# Patient Record
Sex: MALE | Race: BLACK OR AFRICAN AMERICAN | NOT HISPANIC OR LATINO | URBAN - METROPOLITAN AREA
[De-identification: names, ages, dates, MRNs, and addresses within clinical notes are randomized per-mention and may not be internally consistent; named-entity substitution may affect disease eponyms.]

---

## 2023-02-23 ENCOUNTER — HOSPITAL ENCOUNTER (EMERGENCY)
Facility: OTHER | Age: 33
Discharge: HOME | End: 2023-02-23
Attending: EMERGENCY MEDICINE
Payer: COMMERCIAL

## 2023-02-23 VITALS
DIASTOLIC BLOOD PRESSURE: 96 MMHG | OXYGEN SATURATION: 96 % | WEIGHT: 242 LBS | RESPIRATION RATE: 20 BRPM | SYSTOLIC BLOOD PRESSURE: 135 MMHG | HEIGHT: 67 IN | HEART RATE: 81 BPM | TEMPERATURE: 98 F | BODY MASS INDEX: 37.98 KG/M2

## 2023-02-23 DIAGNOSIS — R19.5 DARK STOOLS: Primary | ICD-10-CM

## 2023-02-23 DIAGNOSIS — R10.13 EPIGASTRIC PAIN: ICD-10-CM

## 2023-02-23 LAB
ALBUMIN SERPL BCP-MCNC: 4.3 G/DL (ref 3.5–5.2)
ALP SERPL-CCNC: 63 U/L (ref 55–135)
ALT SERPL W/O P-5'-P-CCNC: 45 U/L (ref 10–44)
ANION GAP SERPL CALC-SCNC: 9 MMOL/L (ref 8–16)
AST SERPL-CCNC: 23 U/L (ref 10–40)
BASOPHILS # BLD AUTO: 0.05 K/UL (ref 0–0.2)
BASOPHILS NFR BLD: 0.9 % (ref 0–1.9)
BILIRUB SERPL-MCNC: 0.7 MG/DL (ref 0.1–1)
BILIRUB UR QL STRIP: NEGATIVE
BUN SERPL-MCNC: 9 MG/DL (ref 6–20)
CALCIUM SERPL-MCNC: 9.6 MG/DL (ref 8.7–10.5)
CHLORIDE SERPL-SCNC: 104 MMOL/L (ref 95–110)
CLARITY UR: CLEAR
CO2 SERPL-SCNC: 26 MMOL/L (ref 23–29)
COLOR UR: YELLOW
CREAT SERPL-MCNC: 0.9 MG/DL (ref 0.5–1.4)
DIFFERENTIAL METHOD: ABNORMAL
EOSINOPHIL # BLD AUTO: 0.3 K/UL (ref 0–0.5)
EOSINOPHIL NFR BLD: 5 % (ref 0–8)
ERYTHROCYTE [DISTWIDTH] IN BLOOD BY AUTOMATED COUNT: 12.8 % (ref 11.5–14.5)
EST. GFR  (NO RACE VARIABLE): >60 ML/MIN/1.73 M^2
GLUCOSE SERPL-MCNC: 92 MG/DL (ref 70–110)
GLUCOSE UR QL STRIP: NEGATIVE
HCT VFR BLD AUTO: 46.8 % (ref 40–54)
HGB BLD-MCNC: 15.2 G/DL (ref 14–18)
HGB UR QL STRIP: NEGATIVE
IMM GRANULOCYTES # BLD AUTO: 0.01 K/UL (ref 0–0.04)
IMM GRANULOCYTES NFR BLD AUTO: 0.2 % (ref 0–0.5)
KETONES UR QL STRIP: NEGATIVE
LEUKOCYTE ESTERASE UR QL STRIP: NEGATIVE
LIPASE SERPL-CCNC: 22 U/L (ref 4–60)
LYMPHOCYTES # BLD AUTO: 2.4 K/UL (ref 1–4.8)
LYMPHOCYTES NFR BLD: 40.7 % (ref 18–48)
MCH RBC QN AUTO: 29 PG (ref 27–31)
MCHC RBC AUTO-ENTMCNC: 32.5 G/DL (ref 32–36)
MCV RBC AUTO: 89 FL (ref 82–98)
MONOCYTES # BLD AUTO: 0.7 K/UL (ref 0.3–1)
MONOCYTES NFR BLD: 11.8 % (ref 4–15)
NEUTROPHILS # BLD AUTO: 2.4 K/UL (ref 1.8–7.7)
NEUTROPHILS NFR BLD: 41.4 % (ref 38–73)
NITRITE UR QL STRIP: NEGATIVE
NRBC BLD-RTO: 0 /100 WBC
PH UR STRIP: 6 [PH] (ref 5–8)
PLATELET # BLD AUTO: 291 K/UL (ref 150–450)
PMV BLD AUTO: 8.7 FL (ref 9.2–12.9)
POTASSIUM SERPL-SCNC: 4.3 MMOL/L (ref 3.5–5.1)
PROT SERPL-MCNC: 8.2 G/DL (ref 6–8.4)
PROT UR QL STRIP: NEGATIVE
RBC # BLD AUTO: 5.25 M/UL (ref 4.6–6.2)
SODIUM SERPL-SCNC: 139 MMOL/L (ref 136–145)
SP GR UR STRIP: 1.02 (ref 1–1.03)
URN SPEC COLLECT METH UR: NORMAL
UROBILINOGEN UR STRIP-ACNC: NEGATIVE EU/DL
WBC # BLD AUTO: 5.77 K/UL (ref 3.9–12.7)

## 2023-02-23 PROCEDURE — 99283 EMERGENCY DEPT VISIT LOW MDM: CPT

## 2023-02-23 PROCEDURE — 80053 COMPREHEN METABOLIC PANEL: CPT | Performed by: PHYSICIAN ASSISTANT

## 2023-02-23 PROCEDURE — 83690 ASSAY OF LIPASE: CPT | Performed by: PHYSICIAN ASSISTANT

## 2023-02-23 PROCEDURE — 85025 COMPLETE CBC W/AUTO DIFF WBC: CPT | Performed by: PHYSICIAN ASSISTANT

## 2023-02-23 PROCEDURE — 81003 URINALYSIS AUTO W/O SCOPE: CPT | Performed by: PHYSICIAN ASSISTANT

## 2023-02-23 RX ORDER — SUCRALFATE 1 G/10ML
1 SUSPENSION ORAL 4 TIMES DAILY
Qty: 414 ML | Refills: 0 | Status: SHIPPED | OUTPATIENT
Start: 2023-02-23

## 2023-02-24 NOTE — FIRST PROVIDER EVALUATION
Emergency Department TeleTriage Encounter Note      CHIEF COMPLAINT    Chief Complaint   Patient presents with    Melena     Pt c/o chills, HA, black stools and vomiting. One episode of black stool which was last night. Pt has had repeated episodes of vomiting which turned black last night. Denies abdominal pain at this time. Went to urgent care [facility] and was swabbed for covid and flu (negative for both) but was sent here at mention of black stool/emesis. In no acute distress, well appearing.        VITAL SIGNS   Initial Vitals [02/23/23 1811]   BP Pulse Resp Temp SpO2   (!) 135/96 81 20 98.1 °F (36.7 °C) 96 %      MAP       --            ALLERGIES    Review of patient's allergies indicates:  No Known Allergies    PROVIDER TRIAGE NOTE  Patient presents with complaint of abdominal pain (resolved), vomiting, and melena since yesterday. No prior history of GI bleed.       ORDERS  Labs Reviewed - No data to display    ED Orders (720h ago, onward)      None              Virtual Visit Note: The provider triage portion of this emergency department evaluation and documentation was performed via [employer], a HIPAA-compliant telemedicine application, in concert with a tele-presenter in the room. A face to face patient evaluation with one of my colleagues will occur once the patient is placed in an emergency department room.      DISCLAIMER: This note was prepared with ROKT voice recognition transcription software. Garbled syntax, mangled pronouns, and other bizarre constructions may be attributed to that software system.

## 2023-02-24 NOTE — ED PROVIDER NOTES
Chief complaint:  Melena (Pt c/o chills, HA, black stools and vomiting. One episode of black stool which was last night. Pt has had repeated episodes of vomiting which turned black last night. Denies abdominal pain at this time. Went to urgent care [facility] and was swabbed for covid and flu (negative for both) but was sent here at mention of black stool/emesis. In no acute distress, well appearing. )      HPI:  Mau Bello is a 32 y.o. male presenting with with concern for epigastric discomfort and dark stools since last night.  Patient was evaluated at urgent Care, and referred to the emergency department following negative flu, and COVID.  Patient denies history of known peptic ulcer disease, pancreatitis.      ROS: As per HPI and below:    Constitutional: -fever, +chills.  Eyes: No discharge. No pain.  HENT: no nasal congestion, -anosmia; No sore throat.   Cardiovascular: - chest pain, no palpitations.  Respiratory: -cough, -shortness of breath.  Gastrointestinal: +nausea, no diarrhea, + abdominal pain, +vomiting.   Genitourinary: No hematuria, dysuria, urgency.  Musculoskeletal: No back pain.  Skin: No rashes, no lesions.  Neurological: +headache, no focal weakness.    Review of patient's allergies indicates:  No Known Allergies    No current facility-administered medications on file prior to encounter.     No current outpatient medications on file prior to encounter.       PMH:  As per HPI and below:  No past medical history on file.  No past surgical history on file.    Social History     Socioeconomic History    Marital status: Single       No family history on file.    Physical Exam:    Vitals:    02/23/23 1811   BP: (!) 135/96   Pulse: 81   Resp: 20   Temp: 98.1 °F (36.7 °C)         General Appearance: The patient is alert, has no immediate or signs of toxicity. No acute distress.    HEENT:  Extra ocular movements intact. No drainage.   Neck: No stridor.   Respiratory: No increased work of breathing,  speaking in full sentences  CV: nml perfusion, no elevated JVD  Gastrointestinal:   No guarding  Neurological: Alert and appropriate, moving all extremities spontaneously  Skin: Warm and dry, no rashes.  Musculoskeletal: Extremities without notable edema, appropriate ROM     exam performed chaperone present, no external hemorrhoids, no anal fissures, guaiac negative  Psych: Normal affect, no evidence of psychosis    Labs Reviewed   CBC W/ AUTO DIFFERENTIAL - Abnormal; Notable for the following components:       Result Value    MPV 8.7 (*)     All other components within normal limits   COMPREHENSIVE METABOLIC PANEL - Abnormal; Notable for the following components:    ALT 45 (*)     All other components within normal limits   LIPASE   URINALYSIS, REFLEX TO URINE CULTURE    Narrative:     Specimen Source->Urine         MDM:    32 y.o. male with concern for dark stools, 1 episode of vomiting, and headache.  Patient evaluated at urgent Care with negative flu influenza.  Labs obtained here with no acute anemia.  Abdominal exam is benign, no blood on rectal exam.  Discussed need for follow-up with GI if symptoms progress, concern for possible peptic ulcer versus gastritis.    Medications - No data to display         Diagnoses:    1. Dark stools    2. Epigastric pain                 Medina Galeano MD  02/23/23 2025

## 2023-02-24 NOTE — ED TRIAGE NOTES
Pt presents to the ED with c/o rectal bleeding that started yesterday. Pt denies abd pain or fevers. Pt AAOx4, NAD noted.

## 2023-03-06 ENCOUNTER — HOSPITAL ENCOUNTER (EMERGENCY)
Facility: OTHER | Age: 33
Discharge: HOME | End: 2023-03-06
Attending: EMERGENCY MEDICINE
Payer: COMMERCIAL

## 2023-03-06 VITALS
RESPIRATION RATE: 17 BRPM | TEMPERATURE: 98 F | BODY MASS INDEX: 38.37 KG/M2 | HEART RATE: 90 BPM | WEIGHT: 245 LBS | OXYGEN SATURATION: 100 % | DIASTOLIC BLOOD PRESSURE: 89 MMHG | SYSTOLIC BLOOD PRESSURE: 142 MMHG

## 2023-03-06 DIAGNOSIS — I88.9 ADENITIS: ICD-10-CM

## 2023-03-06 DIAGNOSIS — R60.9 SWELLING: ICD-10-CM

## 2023-03-06 DIAGNOSIS — L03.90 CELLULITIS, UNSPECIFIED CELLULITIS SITE: Primary | ICD-10-CM

## 2023-03-06 PROCEDURE — 99284 EMERGENCY DEPT VISIT MOD MDM: CPT | Mod: 25

## 2023-03-06 PROCEDURE — 25000003 PHARM REV CODE 250: Performed by: EMERGENCY MEDICINE

## 2023-03-06 RX ORDER — DOXYCYCLINE HYCLATE 100 MG
100 TABLET ORAL
Status: COMPLETED | OUTPATIENT
Start: 2023-03-06 | End: 2023-03-06

## 2023-03-06 RX ORDER — CEPHALEXIN 500 MG/1
500 CAPSULE ORAL
Status: COMPLETED | OUTPATIENT
Start: 2023-03-06 | End: 2023-03-06

## 2023-03-06 RX ORDER — CEPHALEXIN 500 MG/1
500 CAPSULE ORAL EVERY 8 HOURS
Qty: 30 CAPSULE | Refills: 0 | Status: SHIPPED | OUTPATIENT
Start: 2023-03-06 | End: 2023-03-16

## 2023-03-06 RX ORDER — DOXYCYCLINE 100 MG/1
100 CAPSULE ORAL 2 TIMES DAILY
Qty: 20 CAPSULE | Refills: 0 | Status: SHIPPED | OUTPATIENT
Start: 2023-03-06 | End: 2023-03-16

## 2023-03-06 RX ADMIN — CEPHALEXIN 500 MG: 500 CAPSULE ORAL at 11:03

## 2023-03-06 RX ADMIN — DOXYCYCLINE HYCLATE 100 MG: 100 TABLET, COATED ORAL at 11:03

## 2023-03-06 NOTE — Clinical Note
"Mau"Orion Bello was seen and treated in our emergency department on 3/6/2023.  He may return to work on 03/09/2023.       If you have any questions or concerns, please don't hesitate to call.      Yonathan Jordan MD"

## 2023-03-07 NOTE — DISCHARGE INSTRUCTIONS
Mr. Bello,    Use warm water soaks for your toe swelling and infection as well as take the antibiotics prescribed to you.     Thank you for letting me care for you today! It was nice meeting you, and I hope you feel better soon.   If you would like access to your chart and what was done today please utilize the [facility].   Please come back to [facility] for all of your future medical needs.    Our goal in the emergency department is to always give you outstanding care and exceptional service. You may receive a survey by mail or e-mail in the next week regarding your experience in our ED. We would greatly appreciate you completing and returning the survey. Your feedback provides us with a way to recognize our staff who give very good care and it helps us learn how to improve when your experience was below our aspiration of excellence.     Sincerely,    Yonathan Jordan MD  Board Certified Emergency Physician

## 2023-03-07 NOTE — ED TRIAGE NOTES
"Patient to ED with reports of noticing a "red area" on left thigh that spreads down extremity. Denies itching. A&O X 4 upon arrival with no acute signs of distress noted.  "

## 2023-03-07 NOTE — ED PROVIDER NOTES
Encounter Date: 3/6/2023    SCRIBE #1 NOTE: I, Vinay Weinberg, am scribing for, and in the presence of,  Yonathan Jordan MD.     History     Chief Complaint   Patient presents with    Abscess     Possible abscess on the Left inner thigh a few hours ago.       This patient is a 32 y.o. male, with a PMHx of asthma, who presents to the ED with complaint of left leg pain onset six hours ago. The patient reports swelling of his leg and foot. He states he noticed the swelling in his toe last night with pain and swelling. He reports redness in the thigh with mild swelling.  The patient denies any previous injuries to the affected area. The patient notes he had COVID a couple of weeks ago. The patient's friend reports the patient was seen eleven days ago at [facility] for dark stool and hematemesis for which he is taking migraine medications and iron supplements. He denies cigarette and illicit drug use. He notes he has not consumed EtOH for the last two weeks.     The history is provided by the patient and a friend.   Review of patient's allergies indicates:  No Known Allergies  Past Medical History:   Diagnosis Date    Migraine headache      Past Surgical History:   Procedure Laterality Date    HERNIA REPAIR       No family history on file.     Review of Systems  Constitutional-no fever  HEENT-no congestion  Eyes-no redness  Respiratory-no shortness of breath  Cardio-no chest pain  GI-no abdominal pain  Endocrine-no cold intolerance  -no difficulty urinating  MSK-no myalgias. notes left leg and foot joint swelling.  Skin-no rashes  Allergy-no environmental allergy  Neurologic-, no headache  Hematology-no swollen nodes  Behavioral-no confusion   Physical Exam     Initial Vitals [03/06/23 2005]   BP Pulse Resp Temp SpO2   (!) 162/100 89 17 99.5 °F (37.5 °C) 100 %      MAP       --         Physical Exam  Constitutional: Well appearing, no distress.  Eyes: Conjunctivae normal.  ENT       Head: Normocephalic, atraumatic.        Nose: No congestion.       Mouth/Throat: Mucous membranes are moist.  Hematological/Lymphatic/Immunilogical: No cervical lymphadenopathy.  Cardiovascular: Normal rate, regular rhythm. Normal and symmetric distal pulses.  Respiratory: Normal respiratory effort. Breath sounds are normal.  Gastrointestinal: Soft, nontender.   Musculoskeletal: mild erythema and swelling along the left inguinal thigh, some stranding from erythematous 2nd toe up the leg   Neurologic: Alert, oriented. Normal speech and language. No gross focal neurologic deficits are appreciated.  Skin: Skin is warm, dry. No rash noted.  Psychiatric: Mood and affect are normal.    ED Course   Procedures  Labs Reviewed - No data to display       Imaging Results              US Lower Extremity Veins Left (Final result)  Result time 03/06/23 22:51:36      Final result by Shey Brownlee MD (03/06/23 22:51:36)                   Impression:      No evidence of deep venous thrombosis in the left lower extremity.    Prominent lymph nodes in the left groin.    Subcutaneous edema.      Electronically signed by: Shey Brownlee  Date:    03/06/2023  Time:    22:51               Narrative:    EXAMINATION:  US LOWER EXTREMITY VEINS LEFT    CLINICAL HISTORY:  Edema, unspecified    TECHNIQUE:  Duplex and color flow Doppler evaluation and graded compression of the left lower extremity veins was performed.    COMPARISON:  None    FINDINGS:  Left thigh veins: The common femoral, femoral, popliteal, upper greater saphenous, and deep femoral veins are patent and free of thrombus. The veins are normally compressible and have normal phasic flow and augmentation response.    Left calf veins: The visualized calf veins are patent.    Contralateral CFV: The contralateral (right) common femoral vein is patent and free of thrombus.    Miscellaneous: Subcutaneous edema.  Multiple lymph nodes are seen in the left groin.                                       Medications    doxycycline tablet 100 mg (100 mg Oral Given 3/6/23 2310)   cephALEXin capsule 500 mg (500 mg Oral Given 3/6/23 2310)     Medical Decision Making:   History:   Old Medical Records: I decided to obtain old medical records.  Old Records Summarized: records from clinic visits and records from previous admission(s).  Differential Diagnosis:   DVT, Abscess, cellulitis, covid toe  Clinical Tests:   Radiological Study: Ordered and Reviewed  ED Management:  Imaging demonstrates some inflammation without abscess in the nodes, likely has cellulitis and reactive nodes related thereto.  Generally healthy otherwise. Plan for treatment of cellulitis and returni n case of worsening symptoms.         Scribe Attestation:   Scribe #1: I performed the above scribed service and the documentation accurately describes the services I performed. I attest to the accuracy of the note.            Physician Attestation for Scribe: I, yonathan jordan, reviewed documentation as scribed in my presence, which is both accurate and complete.        Clinical Impression:   Final diagnoses:  [R60.9] Swelling  [L03.90] Cellulitis, unspecified cellulitis site (Primary)  [I88.9] Adenitis        ED Disposition Condition    Discharge Stable          ED Prescriptions       Medication Sig Dispense Start Date End Date Auth. Provider    doxycycline (VIBRAMYCIN) 100 MG Cap Take 1 capsule (100 mg total) by mouth 2 (two) times daily. for 10 days 20 capsule 3/6/2023 3/16/2023 Yonathan Jordan MD    cephALEXin (KEFLEX) 500 MG capsule Take 1 capsule (500 mg total) by mouth every 8 (eight) hours. for 10 days 30 capsule 3/6/2023 3/16/2023 Yonathan Jordan MD          Follow-up Information       Follow up With Specialties Details Why Contact Info    [facility] - Emergency Dept Emergency Medicine Go to  As needed, For a follow up visit about today [address] [facility] [address] [phone]             Yonathan Jordan MD  03/07/23  0361